# Patient Record
Sex: MALE
[De-identification: names, ages, dates, MRNs, and addresses within clinical notes are randomized per-mention and may not be internally consistent; named-entity substitution may affect disease eponyms.]

---

## 2017-06-20 ENCOUNTER — HOSPITAL ENCOUNTER (OUTPATIENT)
Dept: HOSPITAL 53 - M CARPUL | Age: 32
End: 2017-06-20
Attending: NURSE PRACTITIONER
Payer: OTHER GOVERNMENT

## 2017-06-20 DIAGNOSIS — R06.02: Primary | ICD-10-CM

## 2017-06-20 DIAGNOSIS — R94.2: ICD-10-CM

## 2017-06-20 NOTE — PFTRPT
Tech: MARILYNN ROSS RRT

Age: 31  Sex: Male  Race: 

Height: 67.00  Inches  Weight: 155.00  Lbs  BSA: 1.81

Diagnosis: R06.02



METHACHOLINE CHALLENGE REPORT: 



ORDERING PROVIDER: KAI Lara



DATE OF SERVICE: 06/20/17



INTERPRETATION:

The study was of excellent technical quality.  Under protocol, methacholine was 
administered.  At a dose of 0.25 mg (1.375 CDUs), a 37% decline in the FEV1 was 
noted.  The PC20 of 0.05 is significant.  Flow rates returned to baseline post 
bronchodilator administration. 



IMPRESSION:

Positive methacholine challenge study.  
MTDD

## 2018-08-05 ENCOUNTER — HOSPITAL ENCOUNTER (EMERGENCY)
Dept: HOSPITAL 53 - M ED | Age: 33
Discharge: HOME | End: 2018-08-05
Payer: COMMERCIAL
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
## 2018-08-05 DIAGNOSIS — S00.93XA: ICD-10-CM
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
## 2018-08-05 DIAGNOSIS — Z87.828: ICD-10-CM

## 2018-08-05 DIAGNOSIS — F10.120: Primary | ICD-10-CM

## 2018-08-05 DIAGNOSIS — M54.2: ICD-10-CM

## 2018-08-05 PROCEDURE — 70450 CT HEAD/BRAIN W/O DYE: CPT
